# Patient Record
(demographics unavailable — no encounter records)

---

## 2024-10-17 NOTE — ASSESSMENT
[FreeTextEntry1] : plan: TP fusion Bx prostate oxybutynin 10 mg ER daily  I had a discussion with the patient regarding the implication of an elevated PSA and the need for further evaluation with a Uronav (meaning fusion MRI-US biopsies) to biopsy the prostate using the transperineal approach.   The potential side effects including bleeding and infection were also detailed. Additionally, we discussed the potential implication of a positive biopsy for prostate cancer, and depending on the grade and stage of the cancer the need for treatment including, active surveillance, radiation, radiation seed implants and surgery.    The patient has agreed to proceed with prostate biopsy. He will stop all aspirin and aspirin like products 10 days prior to the biopsy. There is no need for pre-procedural antibiotics, and he will self-administer a cleansing Fleet's enema just prior to the biopsy.

## 2024-10-17 NOTE — HISTORY OF PRESENT ILLNESS
[FreeTextEntry1] : 59-year-old male hx of BPH with elevated PSA of 12 ng/mL (sept 2024). has had high PSA for years since 2017. + family history of prostate cancer in father late in age. + maternal history of breast cancer in relatives has had genetic testing -- ++ ROXANA gene correlated with Prostate Ca SCr=1.59 // GFR=50 IPSS = 15 /// WENDY = 25 Since 2020, and 2021. dec 2022: has had 3 MRIs of the prostate: "all clean per patient" Feb 2022 MRI prostate ZWP: PS=47cc / no lesions. has had a prostate biopsy in 2021: all cores negative US KB (sept 2024): PS=47cc / GZD=189 cc / no hydronephrosis ///// has recently started finasteride. non-smoker / no blood thinners. October 2024: MRI prostate: PS=46 cc / PIRADS 3 lesion, left TZa - 7x10 mm - neg NVB, SVI, LN // sx on Alfuzosin ER 10 mg: some days are okay. other days are not great. still has severe sense of urgency.

## 2024-10-17 NOTE — PHYSICAL EXAM
[General Appearance - Well Developed] : well developed [General Appearance - Well Nourished] : well nourished [Edema] : no peripheral edema [] : no respiratory distress [Abdomen Soft] : soft [Urinary Bladder Findings] : the bladder was normal on palpation [Normal Station and Gait] : the gait and station were normal for the patient's age [Skin Turgor] : supple [No Focal Deficits] : no focal deficits [Oriented To Time, Place, And Person] : oriented to person, place, and time [de-identified] : MARGO: 3x3 smooth overall

## 2024-10-24 NOTE — HISTORY OF PRESENT ILLNESS
[FreeTextEntry1] : 59 yr old Male hx of hypothyroid, GERD, HLD, elevated PSA; pt sent in by PMD for initial evaluation of elevated SCr. 7/8-7/23 SCr 1.3-1.41, other these labs record SCr from 2018 was 1.0; Pt endorses he has always had elevated PSA, however never as high as they are now at 11; Pt reports minimal PO hydration his whole life 1-2 glasses of fluid daily. Pt states for the past 3 weeks he has started drinking approx 40-60oz of water a day. Pt reports no noticeable dysuria, states his urinary stream is not overly powerful but does not having start-stop, trickling. He does endorse feelings of not emptying his bladder fully. Pt states he was taking a nightly tylenol PM as a sleep aide for the past 20yrs, recently changed to alleve because he researched it was bad for him. Denies any history of diabetes or HTN;  pt seen/examined; pt feels okay however very anxious about his lab results. elevated SCr in setting of obstruction uropathy related to enlarged prostate reviewed on prostate MRI? vs pre-renal pt with poor PO fluid intake? daily NSAID for sleep aide? Will begin with labs and US renal/bladder with PVR. Labs significant for Hyperkalemia 7/3-7/23 4.9-5.4, in 2018 K 4.7; reviewed high potassium foods with patient, gave him educational handout, endorses eating several of the foods multiple times a week. Hyperkalemia increased dietary intake? CKD? isolated level in july?, Pt to follow low K diet and recheck labs in 4 weeks prior to returning to office, if not improved will start low dose lokelma;   Pt seen/examined; had some retrograde ejaculation and post nasal drip on tamsulosin, changed to alfuzosin and symptoms gone; also on finasteride  Current: Pt seen/examined; c-ANCA

## 2024-10-24 NOTE — PHYSICAL EXAM
[General Appearance - Alert] : alert [General Appearance - In No Acute Distress] : in no acute distress [General Appearance - Well Nourished] : well nourished [General Appearance - Well Developed] : well developed [General Appearance - Well-Appearing] : healthy appearing [Sclera] : the sclera and conjunctiva were normal [PERRL With Normal Accommodation] : pupils were equal in size, round, and reactive to light [Extraocular Movements] : extraocular movements were intact [Outer Ear] : the ears and nose were normal in appearance [Neck Appearance] : the appearance of the neck was normal [Jugular Venous Distention Increased] : there was no jugular-venous distention [] : no respiratory distress [Exaggerated Use Of Accessory Muscles For Inspiration] : no accessory muscle use [Apical Impulse] : the apical impulse was normal [Heart Sounds] : normal S1 and S2 [Murmurs] : no murmurs [Edema] : there was no peripheral edema [Bowel Sounds] : normal bowel sounds [Abdomen Soft] : soft [Abdomen Tenderness] : non-tender [No CVA Tenderness] : no ~M costovertebral angle tenderness [Abnormal Walk] : normal gait [Skin Color & Pigmentation] : normal skin color and pigmentation [Skin Turgor] : normal skin turgor [Cranial Nerves] : cranial nerves 2-12 were intact [Oriented To Time, Place, And Person] : oriented to person, place, and time [Impaired Insight] : insight and judgment were intact

## 2024-10-28 NOTE — ASSESSMENT
[FreeTextEntry1] : October 28, 2024: s/p TP fusion Bx:  3/3 cores in target lesion: Gl 7 (3+4) up to 90% involvement--- with pattern 4 =40% of the tumor /// Right anterior bx Gl 7 (3+4)  plan: I had a very lengthy and thorough discussion with Mr. Giraldo regarding the characteristics of his cancer and the risks, benefits, rationale, implications and alternatives of the various management options. I discouraged him from considering primary androgen deprivation therapy since it is not curative and is associated with multiple side effects, such as hot flashes, osteoporosis, decreased libido, erectile dysfunction, and a potentially higher risk of diabetes and heart disease. We spent the bulk of our time discussing three major options, which include radical prostatectomy, radiation therapy, and active surveillance with delayed curative intent. We discussed the rationale of radical prostatectomy performed via either a robotic or open technique.  The concepts of the surgery are removal of the pelvic lymph nodes and prostate, with nerve-sparing as deemed appropriate. These have both diagnostic and therapeutic intent. When performed robotically it is through six small incisions and a 2.5 - 3.5 hour surgery associated with minimal blood loss, an overnight hospital stay, and 5-7 days of Ken catheterization. Major risks of the surgery are rare but do include bleeding, infection, adjacent organ injury, lymphocele, positive margins, severe pain and standard operative risks such as myocardial infarction, stroke, DVT, pneumonia, PE and even a 0.2% chance of death. We also discussed urinary incontinence following surgery. Approximately 25% of men will have nearly complete control of their urination when the catheter is removed, the median time to recovery is approximately 3-4 months but can take up to 12-18 months. Approximately 96% of men will have excellent control of urination at one year following surgery. In regards to sexual function, I explained the median time to recovery of functional erections is 6-8 months but can take up to 18 months. At 18 months following surgery, approximately 75% of men with quality erections prior to surgery who undergo aggressive bilateral nerve-sparing will have functional erections with or without medical therapy. Following surgery the PSA is expected to remain undetectable but if it does rise there is the possibility of salvage curative radiation therapy, if deemed appropriate.   We also discussed the rationale of radiation therapy, which can be delivered via brachytherapy, external beam radiation therapy, or proton beam therapy. There is an excellent track record of success but can be associated with potential side effects which include urinary urgency, frequency, urethral stricture as well as the potential for erectile dysfunction and rectal irritation or frequency of bowel movements. I explained there is an approximately 2% chance of serious bladder or rectal toxicity as well as a very low risk of inducing a secondary malignancy. The potential downside of radiation is the lack of complete pathologic review and lack of reliable salvage curative options.   At the completion of our discussion, multiple questions were answered to the best of my ability. He appears to be very well informed about his cancer as well as the options. I explained to him there is no rush in making a decision.   Based on our discussion, he has decided to take his time to think of his options and decide.

## 2024-10-28 NOTE — PHYSICAL EXAM
[General Appearance - Well Developed] : well developed [General Appearance - Well Nourished] : well nourished [Edema] : no peripheral edema [] : no respiratory distress [Abdomen Soft] : soft [Urinary Bladder Findings] : the bladder was normal on palpation [Normal Station and Gait] : the gait and station were normal for the patient's age [Skin Turgor] : supple [No Focal Deficits] : no focal deficits [Oriented To Time, Place, And Person] : oriented to person, place, and time [de-identified] : MARGO: 3x3 smooth overall

## 2024-11-06 NOTE — PHYSICAL EXAM
[General Appearance - Alert] : alert [General Appearance - In No Acute Distress] : in no acute distress [General Appearance - Well Nourished] : well nourished [General Appearance - Well Developed] : well developed [Sclera] : the sclera and conjunctiva were normal [PERRL With Normal Accommodation] : pupils were equal in size, round, and reactive to light [Examination Of The Oral Cavity] : the lips and gums were normal [Oropharynx] : the oropharynx was normal [Nasal Cavity] : the nasal mucosa and septum were normal [Neck Appearance] : the appearance of the neck was normal [Neck Cervical Mass (___cm)] : no neck mass was observed [Jugular Venous Distention Increased] : there was no jugular-venous distention [Respiration, Rhythm And Depth] : normal respiratory rhythm and effort [Auscultation Breath Sounds / Voice Sounds] : lungs were clear to auscultation bilaterally [Apical Impulse] : the apical impulse was normal [Heart Rate And Rhythm] : heart rate was normal and rhythm regular [Heart Sounds Gallop] : no gallops [Murmurs] : no murmurs [Heart Sounds Pericardial Friction Rub] : no pericardial rub [Abdomen Soft] : soft [Bowel Sounds] : normal bowel sounds [Abdomen Tenderness] : non-tender [No CVA Tenderness] : no ~M costovertebral angle tenderness [Abnormal Walk] : normal gait [Nail Clubbing] : no clubbing  or cyanosis of the fingernails [Involuntary Movements] : no involuntary movements were seen [Motor Tone] : muscle strength and tone were normal [Musculoskeletal - Swelling] : no joint swelling seen [Skin Color & Pigmentation] : normal skin color and pigmentation [Skin Turgor] : normal skin turgor [] : no rash [Skin Lesions] : no skin lesions [Cranial Nerves] : cranial nerves 2-12 were intact [No Focal Deficits] : no focal deficits [Oriented To Time, Place, And Person] : oriented to person, place, and time

## 2024-11-07 NOTE — ASSESSMENT
[FreeTextEntry1] : Mr. Giraldo presents for an initial evaluation of elevated Cr and positive ANCA.  #Elevated CR/AncA positive: The etiology of his elevated creatinine is uncertain, but differential diagnosis includes NSAIDS-related injury (hemodynamic or glomerular), obstructive nephropathy without hydronephrosis in the setting of enlarged prostate/prostate cancer, and ANCA-associated vasculitis though much less likely in the absence of any symptoms and likely absence of active urinary sediment (documented hematuria occurred only after prostate biopsy). Creatinine has improved from a peak of 1.6 to 1.3 with the cessation of NSAIDs in the last two weeks. However, given the persistence of PR3 titer, it may be prudent to pursue a kidney biopsy.  - We've discussed the plan of management detailed below in great detail with the patient and his wife and answered their questions. He is agreeable with proceeding with the following: - Repeat CMP - Repeat ANCA with reflex and MPO and PR3 - Repeat UA, UPCR - Plan for kidney biopsy (has recent CBC, PT/INR) - Discussed symptoms that should prompt him to call the office, including hematuria, foamy urine, edema  Follow-up plan based on lab results

## 2024-11-07 NOTE — END OF VISIT
[] : Fellow [FreeTextEntry3] :  I have seen and examined the patient with the fellow and made amendments to the note as needed. [Time Spent: ___ minutes] : I have spent [unfilled] minutes of time on the encounter which excludes teaching and separately reported services.

## 2024-11-07 NOTE — REVIEW OF SYSTEMS
[Hesitancy] : urinary hesitancy [Nocturia] : nocturia [Anxiety] : anxiety [Fever] : no fever [Feeling Poorly] : not feeling poorly [Feeling Tired] : not feeling tired [Recent Weight Loss (___ Lbs)] : no recent weight loss [Eye Pain] : no eye pain [Red Eyes] : eyes not red [Discharge From Eyes] : no purulent discharge from the eyes [Nosebleeds] : no nosebleeds [Hoarseness] : no hoarseness [Heart Rate Is Slow] : the heart rate was not slow [Heart Rate Is Fast] : the heart rate was not fast [Chest Pain] : no chest pain [Palpitations] : no palpitations [Lower Ext Edema] : no extremity edema [Shortness Of Breath] : no shortness of breath [Wheezing] : no wheezing [Cough] : no cough [SOB on Exertion] : no shortness of breath during exertion [Orthopnea] : no orthopnea [Abdominal Pain] : no abdominal pain [Vomiting] : no vomiting [Diarrhea] : no diarrhea [Melena] : no melena [Dysuria] : no dysuria [Incontinence] : no incontinence [Arthralgias] : no arthralgias [Joint Swelling] : no joint swelling [Joint Stiffness] : no joint stiffness [Limb Pain] : no limb pain [Limb Swelling] : no limb swelling [Skin Lesions] : no skin lesions [Skin Wound] : no skin wound [Itching] : no itching [Confused] : no confusion [Dizziness] : no dizziness [Limb Weakness] : no limb weakness [Muscle Weakness] : no muscle weakness [Easy Bleeding] : no tendency for easy bleeding [Easy Bruising] : no tendency for easy bruising [FreeTextEntry6] : no hemoptysis  [FreeTextEntry8] : nocturia and hesitancy are improving with new meds

## 2024-11-18 NOTE — ASSESSMENT
[FreeTextEntry1] : October 28, 2024: s/p TP fusion Bx:  3/3 cores in target lesion: Gl 7 (3+4) up to 90% involvement--- with pattern 4 =40% of the tumor /// Right anterior bx Gl 7 (3+4) Nov 2024: Decision on tx? patient has decided to proceed with RALP / PLND  plan: I had a very lengthy and thorough discussion with Mr. Giraldo regarding the characteristics of his cancer and the risks, benefits, rationale, implications and alternatives of the various management options. I discouraged him from considering primary androgen deprivation therapy since it is not curative and is associated with multiple side effects, such as hot flashes, osteoporosis, decreased libido, erectile dysfunction, and a potentially higher risk of diabetes and heart disease. We spent the bulk of our time discussing three major options, which include radical prostatectomy, radiation therapy, and active surveillance with delayed curative intent. We discussed the rationale of radical prostatectomy performed via either a robotic or open technique.  The concepts of the surgery are removal of the pelvic lymph nodes and prostate, with nerve-sparing as deemed appropriate. These have both diagnostic and therapeutic intent. When performed robotically it is through six small incisions and a 2.5 - 3.5 hour surgery associated with minimal blood loss, an overnight hospital stay, and 5-7 days of Ken catheterization. Major risks of the surgery are rare but do include bleeding, infection, adjacent organ injury, lymphocele, positive margins, severe pain and standard operative risks such as myocardial infarction, stroke, DVT, pneumonia, PE and even a 0.2% chance of death. We also discussed urinary incontinence following surgery. Approximately 25% of men will have nearly complete control of their urination when the catheter is removed, the median time to recovery is approximately 3-4 months but can take up to 12-18 months. Approximately 96% of men will have excellent control of urination at one year following surgery. In regards to sexual function, I explained the median time to recovery of functional erections is 6-8 months but can take up to 18 months. At 18 months following surgery, approximately 75% of men with quality erections prior to surgery who undergo aggressive bilateral nerve-sparing will have functional erections with or without medical therapy. Following surgery the PSA is expected to remain undetectable but if it does rise there is the possibility of salvage curative radiation therapy, if deemed appropriate.   We also discussed the rationale of radiation therapy, which can be delivered via brachytherapy, external beam radiation therapy, or proton beam therapy. There is an excellent track record of success but can be associated with potential side effects which include urinary urgency, frequency, urethral stricture as well as the potential for erectile dysfunction and rectal irritation or frequency of bowel movements. I explained there is an approximately 2% chance of serious bladder or rectal toxicity as well as a very low risk of inducing a secondary malignancy. The potential downside of radiation is the lack of complete pathologic review and lack of reliable salvage curative options.   At the completion of our discussion, multiple questions were answered to the best of my ability. He appears to be very well informed about his cancer as well as the options. I explained to him there is no rush in making a decision.   Based on our discussion, he has decided to proceed with RALP / Bilateral PLND

## 2024-11-18 NOTE — HISTORY OF PRESENT ILLNESS
[FreeTextEntry1] : 59-year-old male hx of BPH with elevated PSA of 12 ng/mL (sept 2024). has had high PSA for years since 2017. + family history of prostate cancer in father late in age. + maternal history of breast cancer in relatives has had genetic testing --- ++ ROXANA gene correlated with Prostate Ca SCr=1.59 // GFR=50 IPSS = 15 /// WENDY = 25 Since 2020, and 2021. dec 2022: has had 3 MRIs of the prostate: "all clean per patient" Feb 2022 MRI prostate ZWP: PS=47cc / no lesions. had a prostate biopsy in 2021: all cores negative US KB (sept 2024): PS=47cc / LRT=470 cc / no hydronephrosis ///// has recently started finasteride. non-smoker / no blood thinners. October 2024: MRI prostate: PS=46 cc / PIRADS 3 lesion, left TZa - 7x10 mm - neg NVB, SVI, LN // sx on Alfuzosin ER 10 mg: some days are okay. other days are not great. still has severe sense of urgency. PSA=10.2 / 12% free.  PSAD=0.26 October 28, 2024: s/p TP fusion Bx:  3/3 cores in target lesion: Gl 7 (3+4) up to 90% involvement--- with pattern 4 =40% of the tumor /// Right anterior bx Gl 7 (3+4) Nov 2024: Decision on tx? patient has decided to proceed with RALP / PLND

## 2024-11-18 NOTE — PHYSICAL EXAM
[General Appearance - Well Developed] : well developed [General Appearance - Well Nourished] : well nourished [Edema] : no peripheral edema [] : no respiratory distress [Abdomen Soft] : soft [Urinary Bladder Findings] : the bladder was normal on palpation [Normal Station and Gait] : the gait and station were normal for the patient's age [Skin Turgor] : supple [No Focal Deficits] : no focal deficits [Oriented To Time, Place, And Person] : oriented to person, place, and time [de-identified] : MARGO: 3x3 smooth overall

## 2024-12-26 NOTE — HISTORY OF PRESENT ILLNESS
[FreeTextEntry1] : 59-year-old male hx of BPH with elevated PSA of 12 ng/mL (sept 2024). has had high PSA for years since 2017. + family history of prostate cancer in father late in age. + maternal history of breast cancer in relatives has had genetic testing --- ++ ROXANA gene correlated with Prostate Ca SCr=1.59 // GFR=50 IPSS = 15 /// WENDY = 25 Since 2020, and 2021. dec 2022: has had 3 MRIs of the prostate: "all clean per patient" Feb 2022 MRI prostate ZWP: PS=47cc / no lesions. had a prostate biopsy in 2021: all cores negative US KB (sept 2024): PS=47cc / CIL=621 cc / no hydronephrosis ///// has recently started finasteride. non-smoker / no blood thinners. October 2024: MRI prostate: PS=46 cc / PIRADS 3 lesion, left TZa - 7x10 mm - neg NVB, SVI, LN // sx on Alfuzosin ER 10 mg: some days are okay. other days are not great. still has severe sense of urgency. PSA=10.2 / 12% free.  PSAD=0.26 October 28, 2024: s/p TP fusion Bx:  3/3 cores in target lesion: Gl 7 (3+4) up to 90% involvement--- with pattern 4 =40% of the tumor /// Right anterior bx Gl 7 (3+4) Nov 2024: Decision on tx? patient has decided to proceed with RALP / PLND Dec 26 2024: one week s/p RALP - went to ER due to lack of bowel movement - had a CT scan done in ER: Small midline fat-containing supraumbilical hernia

## 2024-12-26 NOTE — PHYSICAL EXAM
[General Appearance - Well Developed] : well developed [General Appearance - Well Nourished] : well nourished [Edema] : no peripheral edema [] : no respiratory distress [Abdomen Soft] : soft [Urinary Bladder Findings] : the bladder was normal on palpation [Normal Station and Gait] : the gait and station were normal for the patient's age [Skin Turgor] : supple [No Focal Deficits] : no focal deficits [Oriented To Time, Place, And Person] : oriented to person, place, and time [de-identified] : MARGO: 3x3 smooth overall

## 2024-12-26 NOTE — ASSESSMENT
[FreeTextEntry1] : October 28, 2024: s/p TP fusion Bx:  3/3 cores in target lesion: Gl 7 (3+4) up to 90% involvement--- with pattern 4 =40% of the tumor /// Right anterior bx Gl 7 (3+4) Nov 2024: Decision on tx? patient has decided to proceed with RALP / PLND Dec 26 2024: one week s/p RALP - went to ER due to lack of bowel movement - had a CT scan done in ER: Small midline fat-containing supraumbilical hernia  plan: PSA 6 weeks tadalafil 5 mg daily Referral for PFPT RTC 6 weeks

## 2025-02-05 NOTE — HISTORY OF PRESENT ILLNESS
[FreeTextEntry1] : 59-year-old male hx of BPH with elevated PSA of 12 ng/mL (sept 2024). has had high PSA for years since 2017. + family history of prostate cancer in father late in age. has had genetic testing --- ++ ROXANA gene correlated with Prostate Ca SCr=1.59 // GFR=50 IPSS = 15 /// WENDY = 25 Feb 2022 MRI prostate ZWP: PS=47cc / no lesions. had a prostate biopsy in 2021: all cores negative October 2024: MRI prostate: PS=46 cc / PIRADS 3 lesion, left TZa - 7x10 mm - neg NVB, SVI, LN // sx on Alfuzosin ER 10 mg: some days are okay. other days are not great. still has severe sense of urgency. PSA=10.2 / 12% free.  PSAD=0.26 October 28, 2024: s/p TP fusion Bx: 3/3 cores in target lesion: Gl 7 (3+4) up to 90% involvement--- with pattern 4 =40% of the tumor /// Right anterior bx Gl 7 (3+4) Dec 26, 2024: one week s/p RALP - went to ER due to lack of bowel movement - had a CT scan done in ER: Small midline fat-containing supraumbilical hernia RALP Path: Gl 7 (3+4) -- pT2 N0 -- neg margins -- 0/5 LN's. Feb 2025: 6-week post RALP PSA <0.01 // continence: 4 diapers per day has stress incontinence. he believes he holds 90% of urine in his bladder. Started the tadalafil 5 mg daily. feels some erectile sensation

## 2025-02-05 NOTE — PHYSICAL EXAM
[General Appearance - Well Developed] : well developed [General Appearance - Well Nourished] : well nourished [Edema] : no peripheral edema [] : no respiratory distress [Abdomen Soft] : soft [Urinary Bladder Findings] : the bladder was normal on palpation [Normal Station and Gait] : the gait and station were normal for the patient's age [Skin Turgor] : supple [No Focal Deficits] : no focal deficits [Oriented To Time, Place, And Person] : oriented to person, place, and time [de-identified] : MARGO: 3x3 smooth overall

## 2025-02-05 NOTE — ASSESSMENT
[FreeTextEntry1] : October 28, 2024: s/p TP fusion Bx:  3/3 cores in target lesion: Gl 7 (3+4) up to 90% involvement--- with pattern 4 =40% of the tumor /// Right anterior bx Gl 7 (3+4) Nov 2024: Decision on tx? patient has decided to proceed with RALP / PLND Dec 26 2024: one week s/p RALP - went to ER due to lack of bowel movement - had a CT scan done in ER: Small midline fat-containing supraumbilical hernia Feb 2025: 6-week post RALP PSA <0.01 // continence: 4 diapers per day has stress incontinence. he believes he holds 90% of urine in his bladder. Started the tadalafil 5 mg daily. feels some erectile sensation  plan: PSA 6 month tadalafil 5 mg daily continue PFPT start sildenafil 50 mg PRN prior to Sex RTC 6 months

## 2025-02-05 NOTE — PHYSICAL EXAM
[General Appearance - Well Developed] : well developed [General Appearance - Well Nourished] : well nourished [Edema] : no peripheral edema [] : no respiratory distress [Abdomen Soft] : soft [Urinary Bladder Findings] : the bladder was normal on palpation [Normal Station and Gait] : the gait and station were normal for the patient's age [Skin Turgor] : supple [No Focal Deficits] : no focal deficits [Oriented To Time, Place, And Person] : oriented to person, place, and time [de-identified] : MARGO: 3x3 smooth overall

## 2025-02-19 NOTE — PHYSICAL EXAM
[Average] : average [Cooperative] : cooperative [Euthymic] : euthymic [General Appearance - Alert] : alert [Full] : full [General Appearance - In No Acute Distress] : in no acute distress [Clear] : clear [General Appearance - Well Nourished] : well nourished [General Appearance - Well Developed] : well developed [Linear/Goal Directed] : linear/goal directed [Sclera] : the sclera and conjunctiva were normal [WNL] : within normal limits [PERRL With Normal Accommodation] : pupils were equal in size, round, and reactive to light [Examination Of The Oral Cavity] : the lips and gums were normal [Oropharynx] : the oropharynx was normal [Nasal Cavity] : the nasal mucosa and septum were normal [Neck Appearance] : the appearance of the neck was normal [Neck Cervical Mass (___cm)] : no neck mass was observed [Jugular Venous Distention Increased] : there was no jugular-venous distention [Respiration, Rhythm And Depth] : normal respiratory rhythm and effort [Auscultation Breath Sounds / Voice Sounds] : lungs were clear to auscultation bilaterally [Apical Impulse] : the apical impulse was normal [Heart Rate And Rhythm] : heart rate was normal and rhythm regular [Heart Sounds Gallop] : no gallops [Murmurs] : no murmurs [Heart Sounds Pericardial Friction Rub] : no pericardial rub [Abdomen Soft] : soft [Bowel Sounds] : normal bowel sounds [Abdomen Tenderness] : non-tender [No CVA Tenderness] : no ~M costovertebral angle tenderness [Abnormal Walk] : normal gait [Nail Clubbing] : no clubbing  or cyanosis of the fingernails [Involuntary Movements] : no involuntary movements were seen [Motor Tone] : muscle strength and tone were normal [Musculoskeletal - Swelling] : no joint swelling seen [Skin Color & Pigmentation] : normal skin color and pigmentation [Skin Turgor] : normal skin turgor [] : no rash [Skin Lesions] : no skin lesions [Cranial Nerves] : cranial nerves 2-12 were intact [No Focal Deficits] : no focal deficits [Oriented To Time, Place, And Person] : oriented to person, place, and time